# Patient Record
Sex: MALE | Race: WHITE | NOT HISPANIC OR LATINO | URBAN - METROPOLITAN AREA
[De-identification: names, ages, dates, MRNs, and addresses within clinical notes are randomized per-mention and may not be internally consistent; named-entity substitution may affect disease eponyms.]

---

## 2018-01-26 ENCOUNTER — OFFICE VISIT - RIVER FALLS (OUTPATIENT)
Dept: FAMILY MEDICINE | Facility: CLINIC | Age: 58
End: 2018-01-26

## 2018-02-19 ENCOUNTER — OFFICE VISIT - HEALTHEAST (OUTPATIENT)
Dept: OTOLARYNGOLOGY | Facility: CLINIC | Age: 58
End: 2018-02-19

## 2018-02-19 DIAGNOSIS — Z98.890 S/P NASAL SEPTOPLASTY: ICD-10-CM

## 2018-05-25 ENCOUNTER — OFFICE VISIT - RIVER FALLS (OUTPATIENT)
Dept: FAMILY MEDICINE | Facility: CLINIC | Age: 58
End: 2018-05-25

## 2019-08-16 ENCOUNTER — OFFICE VISIT - RIVER FALLS (OUTPATIENT)
Dept: FAMILY MEDICINE | Facility: CLINIC | Age: 59
End: 2019-08-16

## 2019-09-27 ENCOUNTER — OFFICE VISIT - RIVER FALLS (OUTPATIENT)
Dept: FAMILY MEDICINE | Facility: CLINIC | Age: 59
End: 2019-09-27

## 2021-06-16 NOTE — PROGRESS NOTES
S:  Patient comes in for recheck after removal of stents after septoplasty.      O:  Pack and stents removed.  Septum is midline and nose is widely patent.    A:  Doing as expected.      P:  No forceful nose blowing for 5 days.  Follow up as needed.

## 2022-02-11 VITALS — TEMPERATURE: 97.6 F | BODY MASS INDEX: 34.96 KG/M2 | HEIGHT: 67 IN

## 2022-02-11 VITALS — TEMPERATURE: 98.1 F

## 2022-02-11 VITALS — TEMPERATURE: 98 F

## 2022-02-15 NOTE — PROGRESS NOTES
Patient:   NADIA LINDA            MRN: [id number]            FIN: [id number]               Age:   59 years     Sex:  Male     :  1960   Associated Diagnoses:   None   Author:   Kye White MD      Chief Complaint   2019 1:15 PM CDT    Left ear pain started again.      History of Present Illness   Patient reports that he has been having ear pain and discomfort in the left. Yesterday he thought he noticed some discomfort. Hearing is down in the left ear, worse than normal. In the past I thought that he might have fungal otitis. It turns out that he didn't fill the acetasol HC and recovered just fing.      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  Colon polyp / SNOMED CT [id number] / Confirmed  Hearing loss / SNOMED CT 28382036 / Confirmed  GERD (Gastroesophageal Reflux Disease) / ICD-9-.81 / Confirmed  Chronic back pain / SNOMED CT 117310909 / Confirmed  Elevated cholesterol with high triglycerides / SNOMED CT 231661208 / Confirmed  Nasal congestion / SNOMED CT 065485820 / Confirmed  Obese / ICD-9-.00 / Probable  Plantar Fasciitis / ICD-9-.71 / Confirmed      Histories   Past Medical History:    Active  Plantar Fasciitis (728.71)  Comments:  12/3/2010 CST 10:08 AM CST - Rosa Elena Mendez CMA  Left  Obese (278.00)  GERD (Gastroesophageal Reflux Disease) (530.81)  Anisocoria (25981637)  Colon polyp ([id number])  Resolved  Fracture right leg: Onset on 1991 at 30 years.  Resolved.  Concussion (047877201): Onset in  at 26 years.  Resolved.  * Hospitalizaed - Concussion: Onset in  at 26 years.  Resolved.  Left Inguinal Hernia (550.90):  Resolved.  Lipoma (214.9):  Resolved.  Comments:  12/3/2010 CST 10:10 AM CST -  Andrea CMA, Rosa Elena  Left flank  Kebede's Esophagus with Esophagitis (530.85):  Resolved.   Family History:    Diabetes mellitus  Mother  Brother  MI - Myocardial infarction  Mother     Procedure history:    Nasal septoplasty ([id number]) on 2/14/2018 at 57 Years.  Colonoscopy ([id number]) on 11/19/2015 at 55 Years.  Comments:  12/17/2015 3:57 PM CST - Frandy RENDON, Doreen  Findings:   tubular adenoma x1; Recommendation:   f/u 5yrs w/ IV sed    11/24/2015 8:48 AM Maude Angulo  Indication: Hx of adenomatous polyps.  Sedation: Midazolam 2 mg IV, fentanyl 100 mcg IV.  Esophagogastroduodenoscopy ([id number]) on 11/19/2015 at 55 Years.  Comments:  11/24/2015 8:49 AM Maude Angulo  Indication: Hx of Kebede's esophagus.  Sedation: Midazolam 2 mg IV, fentanyl 100 mcg IV, cetacaine spray.  Colonoscopy, flexible, proximal to splenic flexure; with biopsy, single or multiple (84871) on 12/24/2012 at 52 Years.  Upper gastrointestinal endoscopy including esophagus, stomach, and either the duodenum and/or jejunum as appropriate; diagnostic, with or without collection of specimen(s) by brushing or washing (separate procedure) (24847) on 12/24/2012 at 52 Years.  Comments:  12/31/2012 2:51 PM CST - Alexa Banda  Exercise stress test ([id number]) on 3/26/2004 at 43 Years.  Comments:  12/17/2010 1:08 PM CST - Gemma Levine A  negative  Nissen fundoplication ([id number]) on 6/15/1993 at 32 Years.      Physical Examination   Vital Signs   9/27/2019 1:15 PM CDT    Temperature Tympanic      97.6 DegF  LOW     Measurements from flowsheet : Measurements   9/27/2019 1:15 PM CDT    Height Measured - Standard                67 in       CONSTITUTIONAL  General Appearance:  Normal, well developed, well nourished, no obvious distress  Ability to Communicate:  communicates appropriately.    HEAD AND FACE  Appearance and Symmetry:  Normal, no scalp or facial scarring or suspicious lesions.  Paranasal sinuses tenderness:  Normal,  Paranasal sinuses non tender    EARS  Clinical speech reception threshold:  Normal, able to hear normal speech.  Auricle:  Normal, Auricles without scars, lesions, masses.  External auditory canal:  Brown debris/cerumen filling the medial extent of the canal. The ear was debrided using suction curettage revealing normal canal and TM.  Tympanic membrane:  Normal, Tympanic membranes normal without swelling or erythema.    NOSE (speculum or scope)  Architecture:  Normal, Grossly normal external nasal architecture with no masses or lesions.  Mucosa:  Normal mucosa, No polyps or masses.  Septum:  Septum slight deviated to the right but the nose is patent bilaterally, which is an improvement.  Turbinates:  Normal, No turbinate abnormalities    NEUROLOGICAL  Speech pattern:  Normal, Proasaic    RESPIRATORY  Symmetry and Respiratory effort:  Normal, Symmetric chest movement and expansion with no increased intercostal retractions or use of accessory muscles.       Impression and Plan   Decreased hearing in the left ear likely related to cerumen which was removed. He reports that it was improved following debridement. Follow up as needed.

## 2022-02-15 NOTE — PROGRESS NOTES
Patient:   NADIA LINDA            MRN: [id number]            FIN: [id number]               Age:   57 years     Sex:  Male     :  1960   Associated Diagnoses:   None   Author:   Kye White MD      Visit Information      Referring Provider:  Brando Beckwith PA-C# [id number]      Chief Complaint   2018 1:08 PM CST    pt here to discuss deviated septum, says he saw you 10-15 yrs ago and was told to return if wants surgery, states he had a scan done a few wks ago and he has recurrent infections,      History of Present Illness   Asked to see by Brando Beckwith for evaluation of deviated septum.  Patient reports that he has an infection on the left side of the nose.  He believes that he gets headaches from having a plugged nose.  He reports that I saw him over a decade ago for his nasal symptoms and recommended septoplasty at that time.  He declined then but wants to reconsider now.  He feels that he can't breathe through the right side of his nose.  No pain.  He gets periodic infection.        Review of Systems   Constitutional:  Negative.    Ear/Nose/Mouth/Throat:  Negative except as documented in history of present illness.    Respiratory:  Negative.       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 / Confirmed  Colon polyp / SNOMED CT [id number] / Confirmed  Hearing loss / SNOMED CT 57370172 / Confirmed  GERD (Gastroesophageal Reflux Disease) / ICD-9-.81 / Confirmed  Chronic back pain / SNOMED CT 043687343 / Confirmed  Elevated cholesterol with high triglycerides / SNOMED CT 647665339 / Confirmed  Nasal congestion / SNOMED CT 954570002 / Confirmed  Obese / ICD-9-.00 / Probable  Plantar Fasciitis / ICD-9-.71 / Confirmed      Histories   Past Medical  History:    Active  Plantar Fasciitis (728.71)  Comments:  12/3/2010 CST 10:08 AM CST - Rosa Elena Mendez CMA  Left  Obese (278.00)  GERD (Gastroesophageal Reflux Disease) (530.81)  Anisocoria (69616065)  Colon polyp ([id number])  Resolved  Fracture right leg: Onset on 2/1/1991 at 30 years.  Resolved.  Concussion (026432215): Onset in 1987 at 26 years.  Resolved.  * Hospitalizaed - Concussion: Onset in 1987 at 26 years.  Resolved.  Left Inguinal Hernia (550.90):  Resolved.  Lipoma (214.9):  Resolved.  Comments:  12/3/2010 CST 10:10 AM CST - Rosa Elena Mendez CMA  Left flank  Kebede's Esophagus with Esophagitis (530.85):  Resolved.   Family History:    Diabetes mellitus  Mother  Brother  MI - Myocardial infarction  Mother     Procedure history:    Colonoscopy ([id number]) on 11/19/2015 at 55 Years.  Comments:  12/17/2015 3:57 PM - Frandy RENDON, Doreen  Findings:   tubular adenoma x1; Recommendation:   f/u 5yrs w/ IV sed    11/24/2015 8:48 AM - Maude Garcia  Indication: Hx of adenomatous polyps.  Sedation: Midazolam 2 mg IV, fentanyl 100 mcg IV.  Esophagogastroduodenoscopy (053115009) on 11/19/2015 at 55 Years.  Comments:  11/24/2015 8:49 AM - Maude Garcia  Indication: Hx of Kebede's esophagus.  Sedation: Midazolam 2 mg IV, fentanyl 100 mcg IV, cetacaine spray.  Colonoscopy, flexible, proximal to splenic flexure; with biopsy, single or multiple (10130) on 12/24/2012 at 52 Years.  Upper gastrointestinal endoscopy including esophagus, stomach, and either the duodenum and/or jejunum as appropriate; diagnostic, with or without collection of specimen(s) by brushing or washing (separate procedure) (55434) on 12/24/2012 at 52 Years.  Comments:  12/31/2012 2:51 PM - Alexa Banda  Exercise stress test ([id number]) on 3/26/2004 at 43 Years.  Comments:  12/17/2010 1:08 PM - Abner, Gemma A  negative  Nissen fundoplication ([id number]) on 6/15/1993 at 32 Years.      Physical Examination   Vital  Signs   1/26/2018 1:08 PM CST    Temperature Tympanic      98.0 DegF     CONSTITUTIONAL  General Appearance:  Normal, well developed, well nourished, no obvious distress  Ability to Communicate:  communicates appropriately.    HEAD AND FACE  Appearance and Symmetry:  Normal, no scalp or facial scarring or suspicious lesions.  Paranasal sinuses tenderness:  Normal, Paranasal sinuses non tender    EARS  Clinical speech reception threshold:  Normal, able to hear normal speech.  Auricle:  Normal, Auricles without scars, lesions, masses.  External auditory canal:  Normal, External auditory canal normal.  Tympanic membrane:  Normal, Tympanic membranes normal without swelling or erythema.  Tympanic membrane mobility:  Normal, Normal tympanic membrane mobility.    NOSE (speculum or scope)  Architecture:  Normal, Grossly normal external nasal architecture with no masses or lesions.  Mucosa:  Normal mucosa, No polyps or masses.  Septum:  Right septal deformity with clear nasal obstruction.  Left nose widely patent.  .  Turbinates:  Normal, No turbinate abnormalities    ORAL CAVITY AND OROPHARYNX  Lips:  Normal.  Dental and gingiva:  Normal, No obvious dental or gingival disease.  Mucosa:  Normal, Moist mucous membranes.  Tongue:  Normal, Tongue mobile with no mucosal abnormalities  Hard and soft palate:  Normal, Hard and soft palate without cleft or mucosal lesions.  Oral pharynx:  Normal, Posterior pharynx without lesions or remarkable asymmetry.  Saliva:  Normal, Clear saliva.  Masses:  Normal, No palpable masses or pathologically enlarged lymph nodes.    NECK  Masses/lymph nodes:  Normal, No worrisome neck masses or lymph nodes.  Salivary glands:  Normal, Parotid and submandibular glands.  Trachea and larynx position:  Normal, Trachea and larynx midline.  Thyroid:  Normal, No thyroid abnormality.  Tenderness:  Normal, No cervical tenderness.  Suppleness:  Normal, Neck supple    NEUROLOGICAL  Speech pattern:  Normal,  Proasaic    RESPIRATORY  Symmetry and Respiratory effort:  Normal, Symmetric chest movement and expansion with no increased intercostal retractions or use of accessory muscles.     CT SINUS  Shows severe right septal deformity with membrane thickening in the left maxillary sinus      Impression and Plan   Right septal deformity with evidence of membrane thickening in the left maxillary sinus.  I discussed septoplasty and left endoscopic sinus surgery.  I discussed the procedure, goals and risks.  I answered his questions.  He would like to proceed. We will set this up in the near future.

## 2022-02-15 NOTE — NURSING NOTE
Comprehensive Intake Entered On:  9/27/2019 1:18 PM CDT    Performed On:  9/27/2019 1:15 PM CDT by Brittany Chilel LPN               Summary   Chief Complaint :   Left ear pain started again.    Advance Directive :   No   Height Measured :   67 in(Converted to: 5 ft 7 in, 170.18 cm)    Temperature Tympanic :   97.6 DegF(Converted to: 36.4 DegC)  (LOW)    Brittany Chilel LPN - 9/27/2019 1:15 PM CDT   Health Status   Allergies Verified? :   Yes   Medication History Verified? :   Yes   Immunizations Current :   Yes   Pre-Visit Planning Status :   Completed   Brittany Chilel LPN - 9/27/2019 1:15 PM CDT   Consents   Consent for Immunization Exchange :   Consent Granted   Consent for Immunizations to Providers :   Consent Granted   Brittany Chilel LPN - 9/27/2019 1:15 PM CDT   Meds / Allergies   (As Of: 9/27/2019 1:18:41 PM CDT)   Allergies (Active)   penicillins  Estimated Onset Date:   Unspecified ; Created By:   Maria T Centeno CMA; Reaction Status:   Active ; Category:   Drug ; Substance:   penicillins ; Type:   Allergy ; Updated By:   Maria T Centeno CMA; Reviewed Date:   8/16/2019 3:30 PM CDT        Medication List   (As Of: 9/27/2019 1:18:41 PM CDT)

## 2022-02-15 NOTE — NURSING NOTE
Quick Intake Entered On:  8/16/2019 3:28 PM CDT    Performed On:  8/16/2019 3:26 PM CDT by Brittany Chilel LPN               Summary   Chief Complaint :   Ear infection in left ear.    Advance Directive :   No   Brittany Chilel LPN - 8/16/2019 3:26 PM CDT   Health Status   Allergies Verified? :   Yes   Medication History Verified? :   Yes   Immunizations Current :   Yes   Pre-Visit Planning Status :   Completed   Brittany Chilel LPN - 8/16/2019 3:26 PM CDT   Consents   Consent for Immunization Exchange :   Consent Granted   Consent for Immunizations to Providers :   Consent Granted   Brittany Chilel LPN - 8/16/2019 3:26 PM CDT   Meds / Allergies   (As Of: 8/16/2019 3:28:45 PM CDT)   Allergies (Active)   penicillins  Estimated Onset Date:   Unspecified ; Created By:   Maria T Centeno CMA; Reaction Status:   Active ; Category:   Drug ; Substance:   penicillins ; Type:   Allergy ; Updated By:   Maria T Centeno CMA; Reviewed Date:   8/16/2019 3:28 PM CDT        Medication List   (As Of: 8/16/2019 3:28:45 PM CDT)

## 2022-02-15 NOTE — PROGRESS NOTES
Patient:   NADIA LINDA            MRN: [id number]            FIN: [id number]               Age:   57 years     Sex:  Male     :  1960   Associated Diagnoses:   None   Author:   Kye White MD      Chief Complaint   2018 2:49 PM CDT    pt here to discuss sinus infection, says he has had sinus infection since November, says he has gone through over six antibiotics, he did have nasal surgery        History of Present Illness   Patient comes in for evaluation of sinus infection. Patient underwent septoplasty and left limited endoscopic sinus surgery. He reports that he had a CT scan in early May. He was treated with antibiotics and is much better. He reports that he is breathing better since surgery. Patient also reports occasional choking sensation.      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 / Confirmed  Colon polyp / SNOMED CT [id number] / Confirmed  Hearing loss / SNOMED CT 31654434 / Confirmed  GERD (Gastroesophageal Reflux Disease) / ICD-9-.81 / Confirmed  Chronic back pain / SNOMED CT 257802500 / Confirmed  Elevated cholesterol with high triglycerides / SNOMED CT 478115504 / Confirmed  Nasal congestion / SNOMED CT 214061321 / Confirmed  Obese / ICD-9-.00 / Probable  Plantar Fasciitis / ICD-9-.71 / Confirmed      Histories   Past Medical History:    Active  Plantar Fasciitis (728.71)  Comments:  12/3/2010 CST 10:08 AM CST - Rosa Elena Mendez CMA  Left  Obese (278.00)  GERD (Gastroesophageal Reflux Disease) (530.81)  Anisocoria (24314797)  Colon polyp ([id number])  Resolved  Fracture right leg: Onset on 1991 at 30 years.  Resolved.  Concussion (723361095): Onset in  at 26 years.  Resolved.  * Hospitalizaed -  Concussion: Onset in 1987 at 26 years.  Resolved.  Left Inguinal Hernia (550.90):  Resolved.  Lipoma (214.9):  Resolved.  Comments:  12/3/2010 CST 10:10 AM CST - Rosa Elena Mendez CMA  Left flank  Kebede's Esophagus with Esophagitis (530.85):  Resolved.   Family History:    Diabetes mellitus  Mother  Brother  MI - Myocardial infarction  Mother     Procedure history:    Nasal septoplasty (06452424) on 2/14/2018 at 57 Years.  Colonoscopy ([id number]) on 11/19/2015 at 55 Years.  Comments:  12/17/2015 3:57 PM - Frandy RENDON, Doreen  Findings:   tubular adenoma x1; Recommendation:   f/u 5yrs w/ IV sed    11/24/2015 8:48 AM - Maude Garcia  Indication: Hx of adenomatous polyps.  Sedation: Midazolam 2 mg IV, fentanyl 100 mcg IV.  Esophagogastroduodenoscopy (481331375) on 11/19/2015 at 55 Years.  Comments:  11/24/2015 8:49 AM - Maude Garcia  Indication: Hx of Kebede's esophagus.  Sedation: Midazolam 2 mg IV, fentanyl 100 mcg IV, cetacaine spray.  Colonoscopy, flexible, proximal to splenic flexure; with biopsy, single or multiple (33498) on 12/24/2012 at 52 Years.  Upper gastrointestinal endoscopy including esophagus, stomach, and either the duodenum and/or jejunum as appropriate; diagnostic, with or without collection of specimen(s) by brushing or washing (separate procedure) (04470) on 12/24/2012 at 52 Years.  Comments:  12/31/2012 2:51 PM - Alexa Banda  Exercise stress test ([id number]) on 3/26/2004 at 43 Years.  Comments:  12/17/2010 1:08 PM - Gemma Levine A  negative  Nissen fundoplication ([id number]) on 6/15/1993 at 32 Years.      Physical Examination   Vital Signs   5/25/2018 2:49 PM CDT    Temperature Tympanic      98.1 DegF     CONSTITUTIONAL  General Appearance:  Normal, well developed, well nourished, no obvious distress  Ability to Communicate:  communicates appropriately.    HEAD AND FACE  Appearance and Symmetry:  Normal, no scalp or facial scarring or suspicious lesions.    NOSE (speculum or  scope)  Architecture:  Normal, Grossly normal external nasal architecture with no masses or lesions.  Mucosa:  Normal mucosa, No polyps or masses.  Septum:  Still deviated to the right but much improved  Turbinates:  Normal, No turbinate abnormalities    ORAL CAVITY AND OROPHARYNX  Lips:  Normal.  Dental and gingiva:  Normal, No obvious dental or gingival disease.  Mucosa:  Normal, Moist mucous membranes.  Tongue:  Normal, Tongue mobile with no mucosal abnormalities  Hard and soft palate:  Normal, Hard and soft palate without cleft or mucosal lesions.  Oral pharynx:  Normal, Posterior pharynx without lesions or remarkable asymmetry.  Saliva:  Normal, Clear saliva.  Masses:  Normal, No palpable masses or pathologically enlarged lymph nodes.lls    LARYNX AND HYPOPHARYNX (mirror or scope)  Voice Quality:  Normal voice quality.  Supra glottis:  Normal, No edema or erythema.  Epiglottis:  Normal, No epiglottic mass or mucosal lesions.  Pyriform sinuses:  Normal, Pyriform sinuses clear.  Vocal cords appearance:  Normal, Vocal cord motion symmetric.  Vocal cord motion:  Normal, Vocal cord motion symmetric  Endolarynx:  Normal, No Endolaryngeal mass or mucosal lesions.    NECK  Masses/lymph nodes:  Normal, No worrisome neck masses or lymph nodes.  Salivary glands:  Normal, Parotid and submandibular glands.  Trachea and larynx position:  Normal, Trachea and larynx midline.  Thyroid:  Normal, No thyroid abnormality.  Tenderness:  Normal, No cervical tenderness.  Suppleness:  Normal, Neck supple    NEUROLOGICAL  Speech pattern:  Normal, Proasaic    RESPIRATORY  Symmetry and Respiratory effort:  Normal, Symmetric chest movement and expansion with no increased intercostal retractions or use of accessory muscles.     CT SINUS  Reviewed images which shows bilateral pansinusitis.      Impression and Plan   History of pansinusiti. The patient is improved. I explained that the the scan confirms the presence of infection. Given the  fact that he is feeling better I explained that there was nothing to be done at this point other then encouraging him to use fluticasone to reduce mucosal swelling and reduce chance for infection. He expressed understanding.

## 2022-02-15 NOTE — PROGRESS NOTES
Patient:   NADIA LINDA            MRN: [id number]            FIN: [id number]               Age:   59 years     Sex:  Male     :  1960   Associated Diagnoses:   None   Author:   Kye White MD      Visit Information      Date of Service: 2019 03:20 pm  Performing Location: [facility]  Encounter#: [id number]      Primary Care Provider (PCP):  Brando Beckwith PA-C    NPI# [id number]      Referring Provider:  Kye White MD    NPI# [id number]      Chief Complaint   2019 3:26 PM CDT    Ear infection in left ear.        History of Present Illness   Patient reports ear infection in the left ear two weeks ago. He was evaluated and treated with two coursed of antibiotics, the most recent being levofloxacin. The reports improvement but still feels some congestion in the left ear. No pain. No drainage.        Review of Systems   Constitutional:  Negative.    Ear/Nose/Mouth/Throat:  Negative except as documented in history of present illness.    Respiratory:  Negative.       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  Colon polyp / SNOMED CT [id number] / Confirmed  Hearing loss / SNOMED CT 72789217 / Confirmed  GERD (Gastroesophageal Reflux Disease) / ICD-9-.81 / Confirmed  Chronic back pain / SNOMED CT 763990847 / Confirmed  Elevated cholesterol with high triglycerides / SNOMED CT 969589209 / Confirmed  Nasal congestion / SNOMED CT 327392320 / Confirmed  Obese / ICD-9-.00 / Probable  Plantar Fasciitis / ICD-9-.71 / Confirmed      Histories   Past Medical History:    Active  Plantar Fasciitis (728.71)  Comments:  12/3/2010 CST 10:08 AM CST - Rosa Elena Mendez CMA  Left  Obese (278.00)  GERD (Gastroesophageal Reflux Disease)  (530.81)  Anisocoria (80035324)  Colon polyp ([id number])  Resolved  Fracture right leg: Onset on 2/1/1991 at 30 years.  Resolved.  Concussion (872543549): Onset in 1987 at 26 years.  Resolved.  * Hospitalizaed - Concussion: Onset in 1987 at 26 years.  Resolved.  Left Inguinal Hernia (550.90):  Resolved.  Lipoma (214.9):  Resolved.  Comments:  12/3/2010 CST 10:10 AM CST - Rosa Elena Mendez CMA  Left flank  Kebede's Esophagus with Esophagitis (530.85):  Resolved.   Family History:    Diabetes mellitus  Mother  Brother  MI - Myocardial infarction  Mother     Procedure history:    Nasal septoplasty ([id number]) on 2/14/2018 at 57 Years.  Colonoscopy ([id number]) on 11/19/2015 at 55 Years.  Comments:  12/17/2015 3:57 PM CST - Frandy RENDON, Doreen  Findings:   tubular adenoma x1; Recommendation:   f/u 5yrs w/ IV sed    11/24/2015 8:48 AM Maude Angulo  Indication: Hx of adenomatous polyps.  Sedation: Midazolam 2 mg IV, fentanyl 100 mcg IV.  Esophagogastroduodenoscopy ([id number]) on 11/19/2015 at 55 Years.  Comments:  11/24/2015 8:49 AM Maude Angulo  Indication: Hx of Kebede's esophagus.  Sedation: Midazolam 2 mg IV, fentanyl 100 mcg IV, cetacaine spray.  Colonoscopy, flexible, proximal to splenic flexure; with biopsy, single or multiple (74425) on 12/24/2012 at 52 Years.  Upper gastrointestinal endoscopy including esophagus, stomach, and either the duodenum and/or jejunum as appropriate; diagnostic, with or without collection of specimen(s) by brushing or washing (separate procedure) (43698) on 12/24/2012 at 52 Years.  Comments:  12/31/2012 2:51 PM CST - Alexa Banda  Exercise stress test ([id number]) on 3/26/2004 at 43 Years.  Comments:  12/17/2010 1:08 PM CST - Gemma Levine A  negative  Nissen fundoplication ([id number]) on 6/15/1993 at 32 Years.      Physical Examination   CONSTITUTIONAL  General Appearance:  Normal, well developed, well nourished, no obvious  distress  Ability to Communicate:  communicates appropriately.    HEAD AND FACE  Appearance and Symmetry:  Normal, no scalp or facial scarring or suspicious lesions.  Paranasal sinuses tenderness:  Normal, Paranasal sinuses non tender    EARS  Clinical speech reception threshold:  Normal, able to hear normal speech.  Auricle:  Normal, Auricles without scars, lesions, masses.  External auditory canal:  Thick dark drainage in the left ear with flecks of black, suggesting fungal infection.  Tympanic membrane:  Normal, Tympanic membranes normal without swelling or erythema.    NOSE (speculum or scope)  Architecture:  Normal, Grossly normal external nasal architecture with no masses or lesions.  Mucosa:  Normal mucosa, No polyps or masses.  Septum:  Normal, Septum non-obstructing.  Turbinates:  Normal, No turbinate abnormalities    NEUROLOGICAL  Speech pattern:  Normal, Proasaic    RESPIRATORY  Symmetry and Respiratory effort:  Normal, Symmetric chest movement and expansion with no increased intercostal retractions or use of accessory muscles.       Impression and Plan   Fungal otitis externa today. I was able to remove some of the material using suction. Will start him on vosol hc 4 drops 3 times a day for 1 week. Follow up as needed.